# Patient Record
Sex: MALE | Race: WHITE | NOT HISPANIC OR LATINO | Employment: STUDENT | URBAN - METROPOLITAN AREA
[De-identification: names, ages, dates, MRNs, and addresses within clinical notes are randomized per-mention and may not be internally consistent; named-entity substitution may affect disease eponyms.]

---
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
## 2021-02-08 ENCOUNTER — SEE NOTE (OUTPATIENT)
Dept: URBAN - METROPOLITAN AREA CLINIC 30 | Facility: CLINIC | Age: 21
Setting detail: DERMATOLOGY
End: 2021-02-08

## 2021-02-08 DIAGNOSIS — R68.89 OTHER GENERAL SYMPTOMS AND SIGNS: ICD-10-CM

## 2021-02-08 PROCEDURE — 99214 OFFICE O/P EST MOD 30 MIN: CPT

## 2021-02-08 RX ORDER — FINASTERIDE 1 MG/1
1 TABLET TABLET, FILM COATED ORAL DAILY
Qty: 90 | Refills: 3
Start: 2021-02-08

## 2022-02-02 ENCOUNTER — RX ONLY (RX ONLY)
Age: 22
End: 2022-02-02

## 2022-02-02 ENCOUNTER — SEE NOTE (OUTPATIENT)
Dept: URBAN - METROPOLITAN AREA CLINIC 31 | Facility: CLINIC | Age: 22
Setting detail: DERMATOLOGY
End: 2022-02-02

## 2022-02-02 DIAGNOSIS — D22.39 MELANOCYTIC NEVI OF OTHER PARTS OF FACE: ICD-10-CM

## 2022-02-02 DIAGNOSIS — L70.0 ACNE VULGARIS: ICD-10-CM

## 2022-02-02 PROCEDURE — 99214 OFFICE O/P EST MOD 30 MIN: CPT

## 2023-02-01 ENCOUNTER — APPOINTMENT (OUTPATIENT)
Dept: URBAN - METROPOLITAN AREA CLINIC 206 | Age: 23
Setting detail: DERMATOLOGY
End: 2023-02-03

## 2023-02-01 DIAGNOSIS — L64.8 OTHER ANDROGENIC ALOPECIA: ICD-10-CM

## 2023-02-01 PROCEDURE — 99213 OFFICE O/P EST LOW 20 MIN: CPT | Mod: 95

## 2023-02-01 PROCEDURE — OTHER COUNSELING: OTHER

## 2023-02-01 PROCEDURE — OTHER MIPS QUALITY: OTHER

## 2023-02-01 PROCEDURE — OTHER PRESCRIPTION: OTHER

## 2023-02-01 PROCEDURE — OTHER ADDITIONAL NOTES: OTHER

## 2023-02-01 PROCEDURE — OTHER PRESCRIPTION MEDICATION MANAGEMENT: OTHER

## 2023-02-01 RX ORDER — FINASTERIDE 1 MG/1
TABLET, FILM COATED ORAL
Qty: 90 | Refills: 3 | Status: ERX | COMMUNITY
Start: 2023-02-01

## 2023-02-01 ASSESSMENT — LOCATION SIMPLE DESCRIPTION DERM
LOCATION SIMPLE: SUPERIOR FOREHEAD
LOCATION SIMPLE: SCALP

## 2023-02-01 ASSESSMENT — LOCATION DETAILED DESCRIPTION DERM
LOCATION DETAILED: RIGHT SUPERIOR PARIETAL SCALP
LOCATION DETAILED: SUPERIOR MID FOREHEAD

## 2023-02-01 ASSESSMENT — LOCATION ZONE DERM
LOCATION ZONE: FACE
LOCATION ZONE: SCALP

## 2023-02-01 NOTE — PROCEDURE: PRESCRIPTION MEDICATION MANAGEMENT
Detail Level: Zone
Continue Regimen: Finasteride 1 gm take 1 tablet once a day\\nRogaine apply twice a day
Render In Strict Bullet Format?: Yes

## 2023-02-01 NOTE — PROCEDURE: ADDITIONAL NOTES
Detail Level: Simple
Render Risk Assessment In Note?: no
Additional Notes: -patient denies Lightheadedness,dizziness or libido changes\\n-continue Rogaine for men once a day